# Patient Record
Sex: FEMALE | Race: WHITE | NOT HISPANIC OR LATINO | Employment: FULL TIME | URBAN - METROPOLITAN AREA
[De-identification: names, ages, dates, MRNs, and addresses within clinical notes are randomized per-mention and may not be internally consistent; named-entity substitution may affect disease eponyms.]

---

## 2021-04-08 ENCOUNTER — TELEPHONE (OUTPATIENT)
Dept: SCHEDULING | Facility: IMAGING CENTER | Age: 21
End: 2021-04-08

## 2021-04-12 ENCOUNTER — OFFICE VISIT (OUTPATIENT)
Dept: MEDICAL GROUP | Facility: PHYSICIAN GROUP | Age: 21
End: 2021-04-12
Payer: COMMERCIAL

## 2021-04-12 VITALS
HEART RATE: 77 BPM | SYSTOLIC BLOOD PRESSURE: 110 MMHG | DIASTOLIC BLOOD PRESSURE: 52 MMHG | HEIGHT: 63 IN | RESPIRATION RATE: 16 BRPM | TEMPERATURE: 98.6 F | BODY MASS INDEX: 33.84 KG/M2 | WEIGHT: 191 LBS | OXYGEN SATURATION: 98 %

## 2021-04-12 DIAGNOSIS — M54.9 CHRONIC NECK AND BACK PAIN: ICD-10-CM

## 2021-04-12 DIAGNOSIS — M54.2 CHRONIC NECK AND BACK PAIN: ICD-10-CM

## 2021-04-12 DIAGNOSIS — Z12.4 SCREENING FOR CERVICAL CANCER: ICD-10-CM

## 2021-04-12 DIAGNOSIS — R73.01 ELEVATED FASTING GLUCOSE: ICD-10-CM

## 2021-04-12 DIAGNOSIS — Z00.00 WELL ADULT EXAM: Primary | ICD-10-CM

## 2021-04-12 DIAGNOSIS — G89.29 CHRONIC NECK AND BACK PAIN: ICD-10-CM

## 2021-04-12 DIAGNOSIS — Z30.011 ENCOUNTER FOR ORAL CONTRACEPTION INITIAL PRESCRIPTION: ICD-10-CM

## 2021-04-12 PROCEDURE — 99385 PREV VISIT NEW AGE 18-39: CPT | Performed by: NURSE PRACTITIONER

## 2021-04-12 RX ORDER — NORETHINDRONE ACETATE AND ETHINYL ESTRADIOL, ETHINYL ESTRADIOL AND FERROUS FUMARATE 1MG-10(24)
1 KIT ORAL DAILY
Qty: 28 TABLET | Refills: 11 | Status: SHIPPED | OUTPATIENT
Start: 2021-04-12 | End: 2022-04-18

## 2021-04-12 ASSESSMENT — PATIENT HEALTH QUESTIONNAIRE - PHQ9: CLINICAL INTERPRETATION OF PHQ2 SCORE: 0

## 2021-04-13 NOTE — ASSESSMENT & PLAN NOTE
Patient has had chronic neck and back pain due to large breasts for the past seven to eight years.  She has always been an active athlete, playing sports such as soccer and basketball in high school.  She gets welts in her shoulder from the weight of her breasts even with excellent support undergarments.  There are many times that her neck, shoulders and back are so sore that she cannot even wear a bra due to the pain.  She is considering breast reduction surgery at this time.

## 2021-04-13 NOTE — ASSESSMENT & PLAN NOTE
Patient would like to be restarted on oral contraceptives. She is not sexually active at this time but does have irregular cycles and feels better when she is on birth control.

## 2021-04-13 NOTE — ASSESSMENT & PLAN NOTE
Patient has a history of elevated fasting glucose and diabetes runs in her family with her father, uncle and paternal grandfather.  She would like to get screening labs for this today.

## 2021-04-13 NOTE — PROGRESS NOTES
Subjective:     CC: Establish care.    HPI:   Stacy presents today to establish care.  She is a healthy, 21-year-old female who graduated from [employer] last year.  She is planning to stay in [facility] so she is establishing care today. Her past medical, social, family and surgical history were reviewed today.  She reports some episodic depression, as well as anxiety which she is able to control with use of marijuana at nighttime.  She would like to restart oral contraceptives today and be referred to establish with a gynecologist.  She has a family history significant for diabetes on her father's side, and has had an elevated fasting glucose reading in the past.  She would like to get some labs for follow up of this.  Finally, she does have severe neck and back pain due to the size of her breasts.  As she is athletic, she has had quite a bit of strain on both her neck and back even with the use of good support garments.  She is at the point now where she often does not wear a bra because of the pain that this causes.     Past Medical History:   Diagnosis Date   • Anxiety    • Depression        Social History     Tobacco Use   • Smoking status: Never Smoker   • Smokeless tobacco: Never Used   Substance Use Topics   • Alcohol use: Not Currently     Comment: social   • Drug use: Yes     Types: Marijuana     Comment: to sleep       Current Outpatient Medications Ordered in Epic   Medication Sig Dispense Refill   • Norethin-Eth Estrad-Fe Biphas (LO LOESTRIN FE) 1 MG-10 MCG / 10 MCG Tab Take 1 tablet by mouth every day. 28 tablet 11     No current [facility]-ordered facility-administered medications on file.       Allergies:  Patient has no known allergies.    Health Maintenance: Deferred.  Her second Covid vaccine is scheduled for April 23, 2021.  She will schedule MA visit for gardasil and trumemba at least two weeks afterward.     ROS:  Gen: no fevers/chills, no changes in weight  Eyes: no changes in vision  ENT: no sore throat,  "no hearing loss, no bloody nose  Pulm: no sob, no cough  CV: no chest pain, no palpitations  GI: no nausea/vomiting, no diarrhea  : no dysuria  MSk: no myalgias, +neck and back pain.  Skin: no rash  Neuro: no headaches, no numbness/tingling  Heme/Lymph: no easy bruising      Objective:       Exam:  /52 (BP Location: Left arm, Patient Position: Sitting, BP Cuff Size: Adult)   Pulse 77   Temp 37 °C (98.6 °F)   Resp 16   Ht 1.6 m (5' 3\")   Wt 86.6 kg (191 lb)   SpO2 98%   BMI 33.83 kg/m²  Body mass index is 33.83 kg/m².    Gen: Alert and oriented, No apparent distress.  Neck: Neck is supple without lymphadenopathy.  Lungs: Normal effort, CTA bilaterally, no wheezes, rhonchi, or rales  CV: Regular rate and rhythm. No murmurs, rubs, or gallops.  Ext: No clubbing, cyanosis, edema.    Labs: None.    Assessment & Plan:     21 y.o. female with the following -     1. Screening for cervical cancer  Referral to gynecology placed for patient to establish care.     2. Elevated fasting glucose  Patient has a history of elevated fasting glucose and diabetes runs in her family with her father, uncle and paternal grandfather.  She would like to get screening labs for this today.  - HEMOGLOBIN A1C; Future    3. Encounter for oral contraception initial prescription  Patient would like to be restarted on oral contraceptives. She is not sexually active at this time but does have irregular cycles and feels better when she is on birth control.    - Norethin-Eth Estrad-Fe Biphas (LO LOESTRIN FE) 1 MG-10 MCG / 10 MCG Tab; Take 1 tablet by mouth every day.  Dispense: 28 tablet; Refill: 11    4. Well adult exam  - Comp Metabolic Panel; Future  - CBC WITHOUT DIFFERENTIAL; Future  - HEMOGLOBIN A1C; Future  - TSH; Future  - VITAMIN D,25 HYDROXY; Future    5. Chronic neck and back pain  Patient has had chronic neck and back pain due to large breasts for the past seven to eight years.  She has always been an active athlete, playing " sports such as soccer and basketball in high school.  She gets welts in her shoulder from the weight of her breasts even with excellent support undergarments.  There are many times that her neck, shoulders and back are so sore that she cannot even wear a bra due to the pain.  She is considering breast reduction surgery at this time.       Return if symptoms worsen or fail to improve.    Please note that this dictation was created using voice recognition software. I have made every reasonable attempt to correct obvious errors, but I expect that there are errors of grammar and possibly content that I did not discover before finalizing the note.

## 2021-05-06 ENCOUNTER — TELEPHONE (OUTPATIENT)
Dept: MEDICAL GROUP | Facility: PHYSICIAN GROUP | Age: 21
End: 2021-05-06

## 2021-05-06 DIAGNOSIS — Z23 NEED FOR VACCINATION: ICD-10-CM

## 2021-05-06 NOTE — TELEPHONE ENCOUNTER
Patient is on the MA Schedule tomorrow for hpv, men b vaccine/injection.    SPECIFIC Action To Be Taken: Orders pending, please sign.

## 2021-05-07 ENCOUNTER — NON-PROVIDER VISIT (OUTPATIENT)
Dept: MEDICAL GROUP | Facility: PHYSICIAN GROUP | Age: 21
End: 2021-05-07
Payer: COMMERCIAL

## 2021-05-07 DIAGNOSIS — Z23 NEED FOR VACCINATION: ICD-10-CM

## 2021-05-07 PROCEDURE — 90651 9VHPV VACCINE 2/3 DOSE IM: CPT | Performed by: FAMILY MEDICINE

## 2021-05-07 PROCEDURE — 90471 IMMUNIZATION ADMIN: CPT | Performed by: FAMILY MEDICINE

## 2021-05-07 PROCEDURE — 90621 MENB-FHBP VACC 2/3 DOSE IM: CPT | Performed by: FAMILY MEDICINE

## 2021-05-07 PROCEDURE — 90472 IMMUNIZATION ADMIN EACH ADD: CPT | Performed by: FAMILY MEDICINE

## 2021-05-07 NOTE — PROGRESS NOTES
"Stacy Ashley is a 21 y.o. female here for a non-provider visit for:   HPV 2 of 3  TRUMENBA (Men B) 2 of 2    Reason for immunization: continue or complete series started at the office  Immunization records indicate need for vaccine: Yes, confirmed with Epic and confirmed with [place] [employer]  Minimum interval has been met for this vaccine: Yes  ABN completed: Not Indicated    Order and dose verified by: RT  VIS Dated  8/15/19, 10/30/19 was given to patient: Yes  All IAC Questionnaire questions were answered \"No.\"    Patient tolerated injection and no adverse effects were observed or reported: Yes    Pt scheduled for next dose in series: No  "

## 2021-06-07 ENCOUNTER — TELEMEDICINE (OUTPATIENT)
Dept: MEDICAL GROUP | Facility: PHYSICIAN GROUP | Age: 21
End: 2021-06-07
Payer: COMMERCIAL

## 2021-06-07 VITALS — WEIGHT: 186 LBS | HEIGHT: 63 IN | BODY MASS INDEX: 32.96 KG/M2

## 2021-06-07 DIAGNOSIS — L50.9 HIVES: ICD-10-CM

## 2021-06-07 PROCEDURE — 99213 OFFICE O/P EST LOW 20 MIN: CPT | Performed by: NURSE PRACTITIONER

## 2021-06-07 RX ORDER — METHYLPREDNISOLONE 4 MG/1
TABLET ORAL
Qty: 21 TABLET | Refills: 0 | Status: SHIPPED | OUTPATIENT
Start: 2021-06-07 | End: 2021-10-12

## 2021-06-07 NOTE — PROGRESS NOTES
"Virtual Visit: Established Patient   This visit was conducted via Zoom using secure and encrypted videoconferencing technology. The patient was in a private location in the state of [place].    The patient's identity was confirmed and verbal consent was obtained for this virtual visit.    Subjective:   CC:   Chief Complaint   Patient presents with   • Urticaria     sporadic chest, arms, back and legs       Stacy Ashley is a 21 y.o. female presenting for evaluation and management of itchy, raise, red lesions of approximately 4 weeks duration.        ROS   Denies any recent fevers or chills. No nausea or vomiting. No chest pains or shortness of breath. +itchy, red, raised skin lesions    No Known Allergies    Current medicines (including changes today)  Current Outpatient Medications   Medication Sig Dispense Refill   • methylPREDNISolone (MEDROL DOSEPAK) 4 MG Tablet Therapy Pack As directed on the packaging label. 21 tablet 0   • Norethin-Eth Estrad-Fe Biphas (LO LOESTRIN FE) 1 MG-10 MCG / 10 MCG Tab Take 1 tablet by mouth every day. (Patient not taking: Reported on 6/7/2021) 28 tablet 11     No current facility-administered medications for this visit.       Patient Active Problem List    Diagnosis Date Noted   • Hives 06/07/2021   • Elevated fasting glucose 04/12/2021   • Encounter for oral contraception initial prescription 04/12/2021   • Chronic neck and back pain 04/12/2021       Family History   Problem Relation Age of Onset   • Diabetes Father    • Diabetes Paternal Uncle    • Cancer Maternal Grandmother    • Diabetes Paternal Grandfather        She  has a past medical history of Anxiety and Depression.  She  has no past surgical history on file.       Objective:   Ht 1.6 m (5' 3\")   Wt 84.4 kg (186 lb)   LMP 05/21/2021 (Approximate)   BMI 32.95 kg/m²     Physical Exam:  Constitutional: Alert, no distress, well-groomed.  Skin: Visible red lesions on arms, wrists, and forehead.   ENMT: Lips pink without " lesions. Phonation normal.  Neck: No masses, no thyromegaly. Moves freely without pain.  Respiratory: Unlabored respiratory effort, no cough or audible wheeze  Psych: Alert and oriented x3, normal affect and mood.       Assessment and Plan:   The following treatment plan was discussed:     1. Hives  Acute condition. Patient reports onset of hives scattered on her face, arms, shoulder and her back.  She states that this began approximately one month ago and was pretty soon after she began the OCP from last visit.  She immediately discontinued her OCP and the hives persisted.  They are itchy, red and raised areas. She has not changed her laundry soap and notes nothing else that she thinks may have caused this outbreak. She has tried OTC benadryl cream for the itching which does help.  Discussed trial of Medrol Dosepak to see if this helps resolve this outbreak for her.  Patient will let me know if she is not feeling significantly better by Thursday.  - methylPREDNISolone (MEDROL DOSEPAK) 4 MG Tablet Therapy Pack; As directed on the packaging label.  Dispense: 21 tablet; Refill: 0        Follow-up: [facility] to let me know if she is improving.

## 2021-06-07 NOTE — ASSESSMENT & PLAN NOTE
Acute condition. Patient reports onset of hives scattered on her face, arms, shoulder and her back.  She states that this began approximately one month ago and was pretty soon after she began the OCP from last visit.  She immediately discontinued her OCP and the hives persisted.  They are itchy, red and raised areas. She has not changed her laundry soap and notes nothing else that she thinks may have caused this outbreak. She has tried OTC benadryl cream for the itching which does help.  Discussed trial of Medrol Dosepak to see if this helps resolve this outbreak for her.  Patient will let me know if she is not feeling significantly better by Thursday.

## 2021-10-12 ENCOUNTER — OFFICE VISIT (OUTPATIENT)
Dept: MEDICAL GROUP | Facility: PHYSICIAN GROUP | Age: 21
End: 2021-10-12
Payer: COMMERCIAL

## 2021-10-12 VITALS
DIASTOLIC BLOOD PRESSURE: 82 MMHG | OXYGEN SATURATION: 98 % | SYSTOLIC BLOOD PRESSURE: 108 MMHG | BODY MASS INDEX: 32.25 KG/M2 | HEART RATE: 59 BPM | TEMPERATURE: 98 F | WEIGHT: 182 LBS | HEIGHT: 63 IN | RESPIRATION RATE: 16 BRPM

## 2021-10-12 DIAGNOSIS — H65.04 RECURRENT ACUTE SEROUS OTITIS MEDIA OF RIGHT EAR: Primary | ICD-10-CM

## 2021-10-12 DIAGNOSIS — Z23 NEED FOR VACCINATION: ICD-10-CM

## 2021-10-12 PROCEDURE — 90686 IIV4 VACC NO PRSV 0.5 ML IM: CPT | Performed by: NURSE PRACTITIONER

## 2021-10-12 PROCEDURE — 90471 IMMUNIZATION ADMIN: CPT | Performed by: NURSE PRACTITIONER

## 2021-10-12 PROCEDURE — 99214 OFFICE O/P EST MOD 30 MIN: CPT | Mod: 25 | Performed by: NURSE PRACTITIONER

## 2021-10-12 RX ORDER — AMOXICILLIN AND CLAVULANATE POTASSIUM 875; 125 MG/1; MG/1
1 TABLET, FILM COATED ORAL 2 TIMES DAILY
Qty: 14 TABLET | Refills: 0 | Status: SHIPPED | OUTPATIENT
Start: 2021-10-12

## 2021-10-13 NOTE — ASSESSMENT & PLAN NOTE
Acute condition. Patient states that she gets an ear infection every year in October, like clockwork.  She states that she normally takes oral antibiotics and this clears it up. She noticed her right ear becoming hot and painful Monday night and made an appointment to see me today.  She states that after she completes her course of antibiotics she normally takes a decongestant for several weeks afterward to help keep her ear cleared up.  She will follow up if her ear does not clear in two weeks, sooner if worse.

## 2021-10-13 NOTE — PROGRESS NOTES
"Subjective:     CC: Acute right ear pain.    HPI:   Stacy presents today with recent onset of acute right ear pain. She states that this began Monday night and is associated with considerable right ear pain and tenderness.  She has muffled sound.  She denies fever or chills.  She states that she does get an ear infection every year in October.  She states that antibiotics has always resolved this for her in the past.     Past Medical History:   Diagnosis Date   • Anxiety    • Depression        Social History     Tobacco Use   • Smoking status: Never Smoker   • Smokeless tobacco: Never Used   Vaping Use   • Vaping Use: Some days   • Substances: Nicotine   Substance Use Topics   • Alcohol use: Yes     Comment: social   • Drug use: Yes     Types: Marijuana, Inhaled     Comment: to sleep       Current Outpatient Medications Ordered in Epic   Medication Sig Dispense Refill   • amoxicillin-clavulanate (AUGMENTIN) 875-125 MG Tab Take 1 Tablet by mouth 2 times a day. 14 Tablet 0   • Norethin-Eth Estrad-Fe Biphas (LO LOESTRIN FE) 1 MG-10 MCG / 10 MCG Tab Take 1 tablet by mouth every day. 28 tablet 11     No current [facility]-ordered facility-administered medications on file.       Allergies:  Patient has no known allergies.    Health Maintenance: Deferred.     ROS:  Gen: no fevers/chills, no changes in weight  Eyes: no changes in vision  ENT: no sore throat, +right ear pain, muffled hearing, no bloody nose  Pulm: no sob, no cough  CV: no chest pain, no palpitations  GI: no nausea/vomiting, no diarrhea  : no dysuria  MSk: no myalgias  Skin: no rash  Neuro: no headaches, no numbness/tingling  Heme/Lymph: no easy bruising      Objective:       Exam:  /82   Pulse (!) 59   Temp 36.7 °C (98 °F)   Resp 16   Ht 1.6 m (5' 3\")   Wt 82.6 kg (182 lb)   SpO2 98%   BMI 32.24 kg/m²  Body mass index is 32.24 kg/m².    Gen: Alert and oriented, No apparent distress.  Heent:  Ears, left ear canal is clear, TM is intact. Right ear, " with erythematous canal.  TM dull and bulging. No light reflex. No drainage noted.   Neck: Neck is supple without lymphadenopathy.  No carotid bruits.  Lungs: Normal effort, CTA bilaterally, no wheezes, rhonchi, or rales  CV: Regular rate and rhythm. No murmurs, rubs, or gallops.  Ext: No clubbing, cyanosis, edema.    Labs: None.    Assessment & Plan:     21 y.o. female with the following -     1. Recurrent acute serous otitis media of right ear  Acute condition. Patient states that she gets an ear infection every year in October, like clockwork.  She states that she normally takes oral antibiotics and this clears it up. She noticed her right ear becoming hot and painful Monday night and made an appointment to see me today.  She states that after she completes her course of antibiotics she normally takes a decongestant for several weeks afterward to help keep her ear cleared up.  She will follow up if her ear does not clear in two weeks, sooner if worse.   - amoxicillin-clavulanate (AUGMENTIN) 875-125 MG Tab; Take 1 Tablet by mouth 2 times a day.  Dispense: 14 Tablet; Refill: 0    2. Need for vaccination  - INFLUENZA VACCINE QUAD INJ (PF)      Return if symptoms worsen or fail to improve.    I have placed the below orders and discussed them with an approved delegating provider.  The MA is performing the below orders under the direction of Rosalind Sullivan MD.    Please note that this dictation was created using voice recognition software. I have made every reasonable attempt to correct obvious errors, but I expect that there are errors of grammar and possibly content that I did not discover before finalizing the note.

## 2022-01-04 ENCOUNTER — HOSPITAL ENCOUNTER (OUTPATIENT)
Dept: LAB | Facility: MEDICAL CENTER | Age: 22
End: 2022-01-04
Attending: NURSE PRACTITIONER
Payer: COMMERCIAL

## 2022-01-04 DIAGNOSIS — Z00.00 WELL ADULT EXAM: ICD-10-CM

## 2022-01-04 LAB
25(OH)D3 SERPL-MCNC: 22 NG/ML (ref 30–100)
ALBUMIN SERPL BCP-MCNC: 4.5 G/DL (ref 3.2–4.9)
ALBUMIN/GLOB SERPL: 1.4 G/DL
ALP SERPL-CCNC: 72 U/L (ref 30–99)
ALT SERPL-CCNC: 17 U/L (ref 2–50)
ANION GAP SERPL CALC-SCNC: 13 MMOL/L (ref 7–16)
AST SERPL-CCNC: 9 U/L (ref 12–45)
BILIRUB SERPL-MCNC: 0.4 MG/DL (ref 0.1–1.5)
BUN SERPL-MCNC: 13 MG/DL (ref 8–22)
CALCIUM SERPL-MCNC: 9.3 MG/DL (ref 8.5–10.5)
CHLORIDE SERPL-SCNC: 101 MMOL/L (ref 96–112)
CO2 SERPL-SCNC: 24 MMOL/L (ref 20–33)
CREAT SERPL-MCNC: 0.65 MG/DL (ref 0.5–1.4)
EST. AVERAGE GLUCOSE BLD GHB EST-MCNC: 111 MG/DL
GLOBULIN SER CALC-MCNC: 3.2 G/DL (ref 1.9–3.5)
GLUCOSE SERPL-MCNC: 115 MG/DL (ref 65–99)
HBA1C MFR BLD: 5.5 % (ref 4–5.6)
POTASSIUM SERPL-SCNC: 4.1 MMOL/L (ref 3.6–5.5)
PROT SERPL-MCNC: 7.7 G/DL (ref 6–8.2)
SODIUM SERPL-SCNC: 138 MMOL/L (ref 135–145)
TSH SERPL DL<=0.005 MIU/L-ACNC: 1.12 UIU/ML (ref 0.38–5.33)

## 2022-01-04 PROCEDURE — 84443 ASSAY THYROID STIM HORMONE: CPT

## 2022-01-04 PROCEDURE — 82306 VITAMIN D 25 HYDROXY: CPT

## 2022-01-04 PROCEDURE — 83036 HEMOGLOBIN GLYCOSYLATED A1C: CPT

## 2022-01-04 PROCEDURE — 36415 COLL VENOUS BLD VENIPUNCTURE: CPT

## 2022-01-04 PROCEDURE — 80053 COMPREHEN METABOLIC PANEL: CPT

## 2022-04-18 DIAGNOSIS — Z30.011 ENCOUNTER FOR ORAL CONTRACEPTION INITIAL PRESCRIPTION: ICD-10-CM

## 2022-04-18 RX ORDER — NORETHINDRONE ACETATE AND ETHINYL ESTRADIOL, ETHINYL ESTRADIOL AND FERROUS FUMARATE 1MG-10(24)
1 KIT ORAL DAILY
Qty: 28 TABLET | Refills: 11 | Status: SHIPPED | OUTPATIENT
Start: 2022-04-18

## 2022-05-31 NOTE — PROGRESS NOTES
I have reviewed and agree with history, assessment and plan for office encounter dated 10/12/2021 with ANN Mauricio.  Face to face encounter/direct observation: No  Suggestions as follows: No changes to assessment or plan  -Rosalind Sullivan MD